# Patient Record
Sex: MALE | Race: WHITE | NOT HISPANIC OR LATINO | URBAN - METROPOLITAN AREA
[De-identification: names, ages, dates, MRNs, and addresses within clinical notes are randomized per-mention and may not be internally consistent; named-entity substitution may affect disease eponyms.]

---

## 2017-12-08 ENCOUNTER — EMERGENCY (EMERGENCY)
Facility: HOSPITAL | Age: 57
LOS: 1 days | Discharge: HOME | End: 2017-12-08
Attending: EMERGENCY MEDICINE
Payer: COMMERCIAL

## 2017-12-08 VITALS
SYSTOLIC BLOOD PRESSURE: 148 MMHG | DIASTOLIC BLOOD PRESSURE: 78 MMHG | WEIGHT: 184.97 LBS | HEART RATE: 100 BPM | RESPIRATION RATE: 18 BRPM | HEIGHT: 67 IN | OXYGEN SATURATION: 99 % | TEMPERATURE: 98 F

## 2017-12-08 LAB
ALBUMIN SERPL ELPH-MCNC: 4.6 G/DL — SIGNIFICANT CHANGE UP (ref 3.3–5.2)
ALP SERPL-CCNC: 82 U/L — SIGNIFICANT CHANGE UP (ref 40–120)
ALT FLD-CCNC: 45 U/L — HIGH
ANION GAP SERPL CALC-SCNC: 15 MMOL/L — SIGNIFICANT CHANGE UP (ref 5–17)
APTT BLD: 38.7 SEC — HIGH (ref 27.5–37.4)
AST SERPL-CCNC: 27 U/L — SIGNIFICANT CHANGE UP
BASOPHILS # BLD AUTO: 0 K/UL — SIGNIFICANT CHANGE UP (ref 0–0.2)
BASOPHILS NFR BLD AUTO: 0.2 % — SIGNIFICANT CHANGE UP (ref 0–2)
BILIRUB SERPL-MCNC: 0.5 MG/DL — SIGNIFICANT CHANGE UP (ref 0.4–2)
BUN SERPL-MCNC: 14 MG/DL — SIGNIFICANT CHANGE UP (ref 8–20)
CALCIUM SERPL-MCNC: 9.8 MG/DL — SIGNIFICANT CHANGE UP (ref 8.6–10.2)
CHLORIDE SERPL-SCNC: 102 MMOL/L — SIGNIFICANT CHANGE UP (ref 98–107)
CO2 SERPL-SCNC: 26 MMOL/L — SIGNIFICANT CHANGE UP (ref 22–29)
CREAT SERPL-MCNC: 0.81 MG/DL — SIGNIFICANT CHANGE UP (ref 0.5–1.3)
EOSINOPHIL # BLD AUTO: 0.1 K/UL — SIGNIFICANT CHANGE UP (ref 0–0.5)
EOSINOPHIL NFR BLD AUTO: 0.8 % — SIGNIFICANT CHANGE UP (ref 0–5)
GLUCOSE SERPL-MCNC: 116 MG/DL — HIGH (ref 70–115)
HCT VFR BLD CALC: 43.3 % — SIGNIFICANT CHANGE UP (ref 42–52)
HGB BLD-MCNC: 15.3 G/DL — SIGNIFICANT CHANGE UP (ref 14–18)
INR BLD: 1.04 RATIO — SIGNIFICANT CHANGE UP (ref 0.88–1.16)
LYMPHOCYTES # BLD AUTO: 2 K/UL — SIGNIFICANT CHANGE UP (ref 1–4.8)
LYMPHOCYTES # BLD AUTO: 21.2 % — SIGNIFICANT CHANGE UP (ref 20–55)
MCHC RBC-ENTMCNC: 28.3 PG — SIGNIFICANT CHANGE UP (ref 27–31)
MCHC RBC-ENTMCNC: 35.3 G/DL — SIGNIFICANT CHANGE UP (ref 32–36)
MCV RBC AUTO: 80 FL — SIGNIFICANT CHANGE UP (ref 80–94)
MONOCYTES # BLD AUTO: 0.7 K/UL — SIGNIFICANT CHANGE UP (ref 0–0.8)
MONOCYTES NFR BLD AUTO: 7.1 % — SIGNIFICANT CHANGE UP (ref 3–10)
NEUTROPHILS # BLD AUTO: 6.7 K/UL — SIGNIFICANT CHANGE UP (ref 1.8–8)
NEUTROPHILS NFR BLD AUTO: 70.5 % — SIGNIFICANT CHANGE UP (ref 37–73)
PLATELET # BLD AUTO: 159 K/UL — SIGNIFICANT CHANGE UP (ref 150–400)
POTASSIUM SERPL-MCNC: 4.1 MMOL/L — SIGNIFICANT CHANGE UP (ref 3.5–5.3)
POTASSIUM SERPL-SCNC: 4.1 MMOL/L — SIGNIFICANT CHANGE UP (ref 3.5–5.3)
PROT SERPL-MCNC: 7.6 G/DL — SIGNIFICANT CHANGE UP (ref 6.6–8.7)
PROTHROM AB SERPL-ACNC: 11.5 SEC — SIGNIFICANT CHANGE UP (ref 9.8–12.7)
RBC # BLD: 5.41 M/UL — SIGNIFICANT CHANGE UP (ref 4.6–6.2)
RBC # FLD: 13.3 % — SIGNIFICANT CHANGE UP (ref 11–15.6)
SODIUM SERPL-SCNC: 143 MMOL/L — SIGNIFICANT CHANGE UP (ref 135–145)
TROPONIN T SERPL-MCNC: <0.01 NG/ML — SIGNIFICANT CHANGE UP (ref 0–0.06)
WBC # BLD: 9.5 K/UL — SIGNIFICANT CHANGE UP (ref 4.8–10.8)
WBC # FLD AUTO: 9.5 K/UL — SIGNIFICANT CHANGE UP (ref 4.8–10.8)

## 2017-12-08 PROCEDURE — 93010 ELECTROCARDIOGRAM REPORT: CPT

## 2017-12-08 PROCEDURE — 99285 EMERGENCY DEPT VISIT HI MDM: CPT

## 2017-12-08 PROCEDURE — 71020: CPT | Mod: 26

## 2017-12-08 NOTE — ED ADULT NURSE NOTE - PERIPHERAL VASCULAR WDL
Pulses equal bilaterally, no edema present.
Alert and oriented, no focal deficits, no motor or sensory deficits.

## 2017-12-08 NOTE — ED ADULT NURSE NOTE - OBJECTIVE STATEMENT
Pt a&ox3 BIBA c.o generalized chest discomfort and epigastric pain s/p eating a meal, pt reports belching and then the pain started. Resting comfortably on stretcher with Charge RN Yared bedside, pt agitated with staff, charge rn bedside for service improvement. rr even and unlabored, maex4, in no apparent dsitress. #20g iv placed to left ac, bloodwork drawn and sent to lab, results pending. PO fluids provided, updated on POC, verbalized understanding for repeat bloodwork @ 0100, family @ bedside, will continue to monitor and reassess

## 2017-12-08 NOTE — ED PROVIDER NOTE - PROGRESS NOTE DETAILS
Pt is tolerating PO at this time. Pt is tolerating PO at this time. and feeling much better pt with heart score=3, pt will f/u cardio/gi and pt walked out w/o papers

## 2017-12-08 NOTE — ED PROVIDER NOTE - MEDICAL DECISION MAKING DETAILS
Will obtain blood work, labs, cardiac enzymes, medicate, cardio f/u outpatient for cardiac stress test, gi f/u outpatient and re-eval

## 2017-12-08 NOTE — ED ADULT TRIAGE NOTE - CHIEF COMPLAINT QUOTE
BIBA pt was having dinner and started experiencing some chest burning and started burping up. Pt states he feels like something is stuck in his esophagus. pt states it happened before.

## 2017-12-09 LAB — TROPONIN T SERPL-MCNC: <0.01 NG/ML — SIGNIFICANT CHANGE UP (ref 0–0.06)

## 2017-12-09 PROCEDURE — 85027 COMPLETE CBC AUTOMATED: CPT

## 2017-12-09 PROCEDURE — 93005 ELECTROCARDIOGRAM TRACING: CPT

## 2017-12-09 PROCEDURE — 85610 PROTHROMBIN TIME: CPT

## 2017-12-09 PROCEDURE — 36415 COLL VENOUS BLD VENIPUNCTURE: CPT

## 2017-12-09 PROCEDURE — 80053 COMPREHEN METABOLIC PANEL: CPT

## 2017-12-09 PROCEDURE — 71046 X-RAY EXAM CHEST 2 VIEWS: CPT

## 2017-12-09 PROCEDURE — 99283 EMERGENCY DEPT VISIT LOW MDM: CPT | Mod: 25

## 2017-12-09 PROCEDURE — 85730 THROMBOPLASTIN TIME PARTIAL: CPT

## 2017-12-09 PROCEDURE — 84484 ASSAY OF TROPONIN QUANT: CPT

## 2017-12-09 NOTE — ED ADULT NURSE REASSESSMENT NOTE - NS ED NURSE REASSESS COMMENT FT1
Pt baseline ms, in no apparent distress resting on stretcher with wife @ bedside, blood work drawn and sent to lab, results pending, po fluids offered updated on poc, pt verbalized understanding smiling and cooperative, will continue to monitor and reassess

## 2018-10-15 ENCOUNTER — RX RENEWAL (OUTPATIENT)
Age: 58
End: 2018-10-15

## 2019-01-03 ENCOUNTER — CHART COPY (OUTPATIENT)
Age: 59
End: 2019-01-03

## 2019-01-03 PROBLEM — K21.9 GASTRO-ESOPHAGEAL REFLUX DISEASE WITHOUT ESOPHAGITIS: Chronic | Status: ACTIVE | Noted: 2017-12-09

## 2019-01-03 PROBLEM — I10 ESSENTIAL (PRIMARY) HYPERTENSION: Chronic | Status: ACTIVE | Noted: 2017-12-09

## 2019-01-07 ENCOUNTER — APPOINTMENT (OUTPATIENT)
Dept: CARDIOLOGY | Facility: CLINIC | Age: 59
End: 2019-01-07
Payer: COMMERCIAL

## 2019-01-07 ENCOUNTER — RX RENEWAL (OUTPATIENT)
Age: 59
End: 2019-01-07

## 2019-01-07 VITALS
WEIGHT: 170 LBS | BODY MASS INDEX: 26.68 KG/M2 | HEART RATE: 69 BPM | SYSTOLIC BLOOD PRESSURE: 158 MMHG | DIASTOLIC BLOOD PRESSURE: 98 MMHG | RESPIRATION RATE: 14 BRPM | HEIGHT: 67 IN

## 2019-01-07 DIAGNOSIS — R07.2 PRECORDIAL PAIN: ICD-10-CM

## 2019-01-07 PROCEDURE — 99215 OFFICE O/P EST HI 40 MIN: CPT

## 2019-01-07 PROCEDURE — 93000 ELECTROCARDIOGRAM COMPLETE: CPT

## 2019-01-08 ENCOUNTER — APPOINTMENT (OUTPATIENT)
Dept: CARDIOLOGY | Facility: CLINIC | Age: 59
End: 2019-01-08
Payer: COMMERCIAL

## 2019-01-08 PROCEDURE — 93306 TTE W/DOPPLER COMPLETE: CPT

## 2019-01-08 NOTE — ASSESSMENT
[FreeTextEntry1] : 1.  EKG today reveals sinus rhythm at 69 bpm.  One PVC noted.  No acute ischemic changes.  LVH voltage. \par 2.  Hypertension:  Blood pressure is suboptimally controlled at this time.  I have advised patient to increase Ramipril to 10 mg b.i.d.  He will also continue Amlodipine at 5 mg b.i.d.  \par 3.  Chest pain with radiation to neck associated with flutters:  Patient to undergo 24-hour Holter monitor as well as an echocardiogram and a nuclear stress test.  I have advised him on low-dose aspirin.  He is to avoid any unnecessary exertion until further notice.   \par

## 2019-01-08 NOTE — PHYSICAL EXAM
[General Appearance - Well Developed] : well developed [General Appearance - Well Nourished] : well nourished [General Appearance - In No Acute Distress] : no acute distress [Normal Conjunctiva] : the conjunctiva exhibited no abnormalities [Normal Oral Mucosa] : normal oral mucosa [Auscultation Breath Sounds / Voice Sounds] : lungs were clear to auscultation bilaterally [Heart Rate And Rhythm] : heart rate and rhythm were normal [Heart Sounds] : normal S1 and S2 [Bowel Sounds] : normal bowel sounds [Abnormal Walk] : normal gait [Skin Color & Pigmentation] : normal skin color and pigmentation [Skin Turgor] : normal skin turgor [Oriented To Time, Place, And Person] : oriented to person, place, and time [Affect] : the affect was normal [Mood] : the mood was normal [FreeTextEntry1] : No edema

## 2019-01-08 NOTE — REASON FOR VISIT
[FreeTextEntry1] : Mr. Hurd presents today for the evaluation and management of hypertension and hypercholesterolemia. \par   \par

## 2019-01-08 NOTE — HISTORY OF PRESENT ILLNESS
[FreeTextEntry1] : Mr. Hurd states that he recently developed retrosternal chest discomfort that he described as a pressure-like sensation.  Chest pain radiated to his neck where he felt a “choking-like” sensation.  He admits to associated shortness of breath.  He has also been experiencing “flutter-like” sensations which appear to be intermittent.  There has been no lightheadedness or syncope.

## 2019-01-23 ENCOUNTER — APPOINTMENT (OUTPATIENT)
Dept: CARDIOLOGY | Facility: CLINIC | Age: 59
End: 2019-01-23
Payer: COMMERCIAL

## 2019-01-23 PROCEDURE — 93015 CV STRESS TEST SUPVJ I&R: CPT

## 2019-01-23 PROCEDURE — A9500: CPT

## 2019-01-23 PROCEDURE — 78452 HT MUSCLE IMAGE SPECT MULT: CPT

## 2019-01-24 ENCOUNTER — RX RENEWAL (OUTPATIENT)
Age: 59
End: 2019-01-24

## 2019-01-29 RX ORDER — KIT FOR THE PREPARATION OF TECHNETIUM TC99M SESTAMIBI 1 MG/5ML
INJECTION, POWDER, LYOPHILIZED, FOR SOLUTION PARENTERAL
Refills: 0 | Status: COMPLETED | OUTPATIENT
Start: 2019-01-29

## 2019-01-29 RX ADMIN — KIT FOR THE PREPARATION OF TECHNETIUM TC99M SESTAMIBI 0: 1 INJECTION, POWDER, LYOPHILIZED, FOR SOLUTION PARENTERAL at 00:00

## 2019-03-05 ENCOUNTER — APPOINTMENT (OUTPATIENT)
Dept: CARDIOLOGY | Facility: CLINIC | Age: 59
End: 2019-03-05
Payer: COMMERCIAL

## 2019-03-05 VITALS
HEIGHT: 67 IN | RESPIRATION RATE: 14 BRPM | DIASTOLIC BLOOD PRESSURE: 86 MMHG | WEIGHT: 167 LBS | BODY MASS INDEX: 26.21 KG/M2 | HEART RATE: 71 BPM | SYSTOLIC BLOOD PRESSURE: 132 MMHG

## 2019-03-05 PROCEDURE — 93000 ELECTROCARDIOGRAM COMPLETE: CPT

## 2019-03-05 PROCEDURE — 99213 OFFICE O/P EST LOW 20 MIN: CPT

## 2019-08-27 ENCOUNTER — APPOINTMENT (OUTPATIENT)
Dept: CARDIOLOGY | Facility: CLINIC | Age: 59
End: 2019-08-27
Payer: COMMERCIAL

## 2019-08-27 ENCOUNTER — NON-APPOINTMENT (OUTPATIENT)
Age: 59
End: 2019-08-27

## 2019-08-27 VITALS
HEIGHT: 67 IN | BODY MASS INDEX: 26.21 KG/M2 | HEART RATE: 75 BPM | SYSTOLIC BLOOD PRESSURE: 123 MMHG | WEIGHT: 167 LBS | RESPIRATION RATE: 14 BRPM | DIASTOLIC BLOOD PRESSURE: 78 MMHG

## 2019-08-27 PROCEDURE — 99213 OFFICE O/P EST LOW 20 MIN: CPT

## 2019-08-27 PROCEDURE — 93000 ELECTROCARDIOGRAM COMPLETE: CPT

## 2019-08-27 NOTE — DISCUSSION/SUMMARY
[FreeTextEntry1] : 1).  Patient is tolerating cardiac medications without negative side effects, continue with current cardiac medication regimen (refer above).\par \par 2).  Follow up with PCP (Dr. Ignacio) regarding routine checkups and blood work, have copy faxed to our office. \par \par 3).  Diet and lifestyle modification discussed including low fat and low carbohydrate weight reducing diet, implement aerobic exercise 4 to 5 days per week. \par \par 4).  No additional cardiac testing indicated at this time. \par \par 5).  Follow up with our office in 6 months or PRN.

## 2019-08-27 NOTE — REASON FOR VISIT
[FreeTextEntry1] : Mr. Hurd presents today for the evaluation and management of hypertension and hypercholesterolemia.  The patient reports feeling overall well and without cardiac complaints.  The patient denies CP, SOB, GONSALEZ, PND, orthopnea, palpitations, presyncope, syncope.\par .

## 2019-08-27 NOTE — PHYSICAL EXAM
[General Appearance - Well Developed] : well developed [Normal Appearance] : normal appearance [General Appearance - Well Nourished] : well nourished [General Appearance - In No Acute Distress] : no acute distress [Normal Conjunctiva] : the conjunctiva exhibited no abnormalities [Normal Oral Mucosa] : normal oral mucosa [Normal Jugular Venous V Waves Present] : normal jugular venous V waves present [Normal Jugular Venous A Waves Present] : normal jugular venous A waves present [Respiration, Rhythm And Depth] : normal respiratory rhythm and effort [No Jugular Venous Martínez A Waves] : no jugular venous martínez A waves [] : no respiratory distress [Auscultation Breath Sounds / Voice Sounds] : lungs were clear to auscultation bilaterally [Heart Sounds] : normal S1 and S2 [Heart Rate And Rhythm] : heart rate and rhythm were normal [Edema] : no peripheral edema present [Murmurs] : no murmurs present [Abnormal Walk] : normal gait [Bowel Sounds] : normal bowel sounds [Cyanosis, Localized] : no localized cyanosis [Nail Clubbing] : no clubbing of the fingernails [Skin Color & Pigmentation] : normal skin color and pigmentation [Skin Turgor] : normal skin turgor [Oriented To Time, Place, And Person] : oriented to person, place, and time [Affect] : the affect was normal [Impaired Insight] : insight and judgment were intact

## 2019-08-27 NOTE — ASSESSMENT
[FreeTextEntry1] : EKG 8/27/2019:  The EKG illustrates normal sinus rhythm, rate of 75, no significant ST-T wave changes.   Essentially unchanged.\par \par

## 2020-02-07 ENCOUNTER — APPOINTMENT (OUTPATIENT)
Dept: CARDIOLOGY | Facility: CLINIC | Age: 60
End: 2020-02-07
Payer: COMMERCIAL

## 2020-02-07 VITALS
WEIGHT: 166 LBS | DIASTOLIC BLOOD PRESSURE: 80 MMHG | BODY MASS INDEX: 26.06 KG/M2 | SYSTOLIC BLOOD PRESSURE: 116 MMHG | RESPIRATION RATE: 14 BRPM | HEIGHT: 67 IN | HEART RATE: 66 BPM

## 2020-02-07 DIAGNOSIS — R94.31 ABNORMAL ELECTROCARDIOGRAM [ECG] [EKG]: ICD-10-CM

## 2020-02-07 DIAGNOSIS — R00.2 PALPITATIONS: ICD-10-CM

## 2020-02-07 PROCEDURE — 99214 OFFICE O/P EST MOD 30 MIN: CPT

## 2020-02-07 PROCEDURE — 93000 ELECTROCARDIOGRAM COMPLETE: CPT

## 2020-02-10 NOTE — PHYSICAL EXAM
[General Appearance - Well Developed] : well developed [General Appearance - Well Nourished] : well nourished [General Appearance - In No Acute Distress] : no acute distress [Normal Conjunctiva] : the conjunctiva exhibited no abnormalities [Normal Oral Mucosa] : normal oral mucosa [Auscultation Breath Sounds / Voice Sounds] : lungs were clear to auscultation bilaterally [Heart Rate And Rhythm] : heart rate and rhythm were normal [Heart Sounds] : normal S1 and S2 [Abnormal Walk] : normal gait [Bowel Sounds] : normal bowel sounds [Skin Color & Pigmentation] : normal skin color and pigmentation [Skin Turgor] : normal skin turgor [Oriented To Time, Place, And Person] : oriented to person, place, and time [Affect] : the affect was normal [Mood] : the mood was normal [FreeTextEntry1] : No edema

## 2020-02-10 NOTE — ASSESSMENT
[FreeTextEntry1] : 1.  EKG today reveals sinus rhythm at 66 bpm.  Normal intervals.  No evidence of ischemia.  2.  Hyperlipidemia:  Recent lipid profile reveals a total cholesterol of 170, HDL 41, , triglycerides 122, TC/HDL ratio 4.1.  Patient advised on a low-fat / low-cholesterol diet as well as continuation of current medications. 3.  Hypertension:   Blood pressure well controlled at this time on current medications.   4.  Elevated PSA:  Recent PSA of 8.6.   Patient has a urologist and has undergone biopsies and MRIs.  There is no evidence of malignancy at this point.  Being followed closely by urology.   5.  From a cardiac standpoint, patient advised on a low-fat / low-cholesterol diet and regular aerobic exercise.  If clinically stable, office visit six months.

## 2020-02-10 NOTE — REASON FOR VISIT
[FreeTextEntry1] : Mr. Hurd is a pleasant 59-year-old white male with a past medical history significant for hypertension as well as hypercholesterolemia, peripheral edema who presents for cardiac evaluation.   \par \par

## 2020-02-10 NOTE — HISTORY OF PRESENT ILLNESS
[FreeTextEntry1] :  From a cardiac standpoint, Mr. Hurd denies exertional chest pain, shortness of breath, palpitations, lightheadedness, or syncope.  He states that he is reasonably stable but with occasional bouts of elevated blood pressure.  Some of this occurs when emotionally upset and at other times with left shoulder pain.

## 2020-08-07 ENCOUNTER — APPOINTMENT (OUTPATIENT)
Dept: CARDIOLOGY | Facility: CLINIC | Age: 60
End: 2020-08-07
Payer: COMMERCIAL

## 2020-08-07 VITALS
HEIGHT: 67 IN | BODY MASS INDEX: 25.9 KG/M2 | WEIGHT: 165 LBS | SYSTOLIC BLOOD PRESSURE: 118 MMHG | RESPIRATION RATE: 14 BRPM | TEMPERATURE: 97.9 F | HEART RATE: 70 BPM | DIASTOLIC BLOOD PRESSURE: 82 MMHG

## 2020-08-07 PROCEDURE — 99213 OFFICE O/P EST LOW 20 MIN: CPT

## 2020-08-07 PROCEDURE — 93000 ELECTROCARDIOGRAM COMPLETE: CPT

## 2020-08-11 NOTE — HISTORY OF PRESENT ILLNESS
[FreeTextEntry1] : From a cardiac standpoint, Mr. Hurd denies exertional chest pain, shortness of breath, or other cardiac symptoms.  His only comment at this time is that he will undergo complete replacement of his left shoulder at [facility] with Dr. Serafin Minor on Tuesday, August 11.

## 2020-08-11 NOTE — REASON FOR VISIT
[FreeTextEntry1] : Mr. Hurd is a pleasant 60-year-old white male with a past medical history significant for hypertension as well as hypercholesterolemia and intermittent peripheral edema, who presents for evaluation.   \par \par \par

## 2020-08-11 NOTE — PHYSICAL EXAM
[General Appearance - Well Developed] : well developed [General Appearance - Well Nourished] : well nourished [General Appearance - In No Acute Distress] : no acute distress [Normal Conjunctiva] : the conjunctiva exhibited no abnormalities [Normal Oral Mucosa] : normal oral mucosa [Auscultation Breath Sounds / Voice Sounds] : lungs were clear to auscultation bilaterally [Heart Sounds] : normal S1 and S2 [Bowel Sounds] : normal bowel sounds [Heart Rate And Rhythm] : heart rate and rhythm were normal [Abnormal Walk] : normal gait [Skin Color & Pigmentation] : normal skin color and pigmentation [Oriented To Time, Place, And Person] : oriented to person, place, and time [Skin Turgor] : normal skin turgor [Affect] : the affect was normal [Mood] : the mood was normal [FreeTextEntry1] : No edema

## 2021-03-04 ENCOUNTER — APPOINTMENT (OUTPATIENT)
Dept: CARDIOLOGY | Facility: CLINIC | Age: 61
End: 2021-03-04
Payer: COMMERCIAL

## 2021-03-04 ENCOUNTER — NON-APPOINTMENT (OUTPATIENT)
Age: 61
End: 2021-03-04

## 2021-03-04 VITALS
BODY MASS INDEX: 26.53 KG/M2 | RESPIRATION RATE: 14 BRPM | HEART RATE: 73 BPM | DIASTOLIC BLOOD PRESSURE: 70 MMHG | HEIGHT: 67 IN | SYSTOLIC BLOOD PRESSURE: 128 MMHG | TEMPERATURE: 97.5 F | WEIGHT: 169 LBS

## 2021-03-04 DIAGNOSIS — Z01.818 ENCOUNTER FOR OTHER PREPROCEDURAL EXAMINATION: ICD-10-CM

## 2021-03-04 PROCEDURE — 99213 OFFICE O/P EST LOW 20 MIN: CPT

## 2021-03-04 PROCEDURE — 93000 ELECTROCARDIOGRAM COMPLETE: CPT

## 2021-03-04 PROCEDURE — 99072 ADDL SUPL MATRL&STAF TM PHE: CPT

## 2021-03-05 NOTE — HISTORY OF PRESENT ILLNESS
[FreeTextEntry1] : Mr. Hurd presents today for cardiac clearance for an upcoming cystoscopy, urethroscopy, laser lithotripsy and stent with Dr. Starks on 3/9/2021.  Presently the patient is feeling well.  Denies complaints of chest pain, shortness of breath, palpitations, lightheadedness or syncope.

## 2021-03-05 NOTE — REASON FOR VISIT
[FreeTextEntry1] : The patient is a pleasant 61-year-old white male with a past medical history significant for hypertension as well as hypertension and intermittent peripheral edema, who presents for follow up evaluation.

## 2021-03-05 NOTE — DISCUSSION/SUMMARY
[FreeTextEntry1] : 1 - Hypertension:  blood pressure well controlled on current medications.  Advised to follow low sodium diet.\par \par 2 - Hypercholesterolemia: : labs from PCP 2/16/2021 - cholesterol 143, HDL 33, LDL  77, triglycerides 167, TC/HDL 4.3.  Continue with atorvastatin 20mg daily.   Follow low fat, low cholesterol diet.  Will have follow up blood work through PCP office and will have results forwarded to our office.\par \par 3 - Cystoscopy, urethroscopy, laser lithotripsy and stent with Dr. Starks on 3/9/2021: at this time there are no absolute cardiac contraindications for Mr. Hurd to proceed with his need procedure without cardiac precautions.\par \par 4 - Follow up with Dr. Bermudez in one year.

## 2021-12-27 ENCOUNTER — OUTPATIENT (OUTPATIENT)
Dept: OUTPATIENT SERVICES | Facility: HOSPITAL | Age: 61
LOS: 1 days | End: 2021-12-27
Payer: COMMERCIAL

## 2021-12-27 ENCOUNTER — TRANSCRIPTION ENCOUNTER (OUTPATIENT)
Age: 61
End: 2021-12-27

## 2021-12-27 VITALS
WEIGHT: 169.98 LBS | OXYGEN SATURATION: 99 % | SYSTOLIC BLOOD PRESSURE: 137 MMHG | RESPIRATION RATE: 14 BRPM | HEIGHT: 66 IN | TEMPERATURE: 97 F | DIASTOLIC BLOOD PRESSURE: 70 MMHG | HEART RATE: 78 BPM

## 2021-12-27 DIAGNOSIS — Z96.612 PRESENCE OF LEFT ARTIFICIAL SHOULDER JOINT: Chronic | ICD-10-CM

## 2021-12-27 DIAGNOSIS — G56.01 CARPAL TUNNEL SYNDROME, RIGHT UPPER LIMB: ICD-10-CM

## 2021-12-27 DIAGNOSIS — Z98.890 OTHER SPECIFIED POSTPROCEDURAL STATES: Chronic | ICD-10-CM

## 2021-12-27 DIAGNOSIS — Z90.49 ACQUIRED ABSENCE OF OTHER SPECIFIED PARTS OF DIGESTIVE TRACT: Chronic | ICD-10-CM

## 2021-12-27 DIAGNOSIS — Z01.818 ENCOUNTER FOR OTHER PREPROCEDURAL EXAMINATION: ICD-10-CM

## 2021-12-27 DIAGNOSIS — Z96.0 PRESENCE OF UROGENITAL IMPLANTS: Chronic | ICD-10-CM

## 2021-12-27 LAB
ANION GAP SERPL CALC-SCNC: 9 MMOL/L — SIGNIFICANT CHANGE UP (ref 5–17)
BUN SERPL-MCNC: 19 MG/DL — SIGNIFICANT CHANGE UP (ref 7–23)
CALCIUM SERPL-MCNC: 9.2 MG/DL — SIGNIFICANT CHANGE UP (ref 8.4–10.5)
CHLORIDE SERPL-SCNC: 103 MMOL/L — SIGNIFICANT CHANGE UP (ref 96–108)
CO2 SERPL-SCNC: 26 MMOL/L — SIGNIFICANT CHANGE UP (ref 22–31)
CREAT SERPL-MCNC: 0.84 MG/DL — SIGNIFICANT CHANGE UP (ref 0.5–1.3)
GLUCOSE SERPL-MCNC: 133 MG/DL — HIGH (ref 70–99)
HCT VFR BLD CALC: 47.9 % — SIGNIFICANT CHANGE UP (ref 39–50)
HGB BLD-MCNC: 16.1 G/DL — SIGNIFICANT CHANGE UP (ref 13–17)
MCHC RBC-ENTMCNC: 27.5 PG — SIGNIFICANT CHANGE UP (ref 27–34)
MCHC RBC-ENTMCNC: 33.6 GM/DL — SIGNIFICANT CHANGE UP (ref 32–36)
MCV RBC AUTO: 81.7 FL — SIGNIFICANT CHANGE UP (ref 80–100)
NRBC # BLD: 0 /100 WBCS — SIGNIFICANT CHANGE UP (ref 0–0)
PLATELET # BLD AUTO: 194 K/UL — SIGNIFICANT CHANGE UP (ref 150–400)
POTASSIUM SERPL-MCNC: 4 MMOL/L — SIGNIFICANT CHANGE UP (ref 3.5–5.3)
POTASSIUM SERPL-SCNC: 4 MMOL/L — SIGNIFICANT CHANGE UP (ref 3.5–5.3)
RBC # BLD: 5.86 M/UL — HIGH (ref 4.2–5.8)
RBC # FLD: 13.2 % — SIGNIFICANT CHANGE UP (ref 10.3–14.5)
SODIUM SERPL-SCNC: 138 MMOL/L — SIGNIFICANT CHANGE UP (ref 135–145)
WBC # BLD: 5.71 K/UL — SIGNIFICANT CHANGE UP (ref 3.8–10.5)
WBC # FLD AUTO: 5.71 K/UL — SIGNIFICANT CHANGE UP (ref 3.8–10.5)

## 2021-12-27 PROCEDURE — G0463: CPT

## 2021-12-27 PROCEDURE — 93005 ELECTROCARDIOGRAM TRACING: CPT

## 2021-12-27 PROCEDURE — 36415 COLL VENOUS BLD VENIPUNCTURE: CPT

## 2021-12-27 PROCEDURE — 93010 ELECTROCARDIOGRAM REPORT: CPT

## 2021-12-27 PROCEDURE — 80048 BASIC METABOLIC PNL TOTAL CA: CPT

## 2021-12-27 PROCEDURE — 85027 COMPLETE CBC AUTOMATED: CPT

## 2021-12-27 RX ORDER — RAMIPRIL 5 MG
0 CAPSULE ORAL
Qty: 0 | Refills: 0 | DISCHARGE

## 2021-12-27 RX ORDER — OMEPRAZOLE 10 MG/1
0 CAPSULE, DELAYED RELEASE ORAL
Qty: 0 | Refills: 0 | DISCHARGE

## 2021-12-27 RX ORDER — ATORVASTATIN CALCIUM 80 MG/1
0 TABLET, FILM COATED ORAL
Qty: 0 | Refills: 0 | DISCHARGE

## 2021-12-27 RX ORDER — AMLODIPINE BESYLATE 2.5 MG/1
0 TABLET ORAL
Qty: 0 | Refills: 0 | DISCHARGE

## 2021-12-27 RX ORDER — TAMSULOSIN HYDROCHLORIDE 0.4 MG/1
0 CAPSULE ORAL
Qty: 0 | Refills: 0 | DISCHARGE

## 2021-12-27 NOTE — H&P PST ADULT - NSICDXPASTMEDICALHX_GEN_ALL_CORE_FT
PAST MEDICAL HISTORY:  BPH (benign prostatic hyperplasia)     GERD (gastroesophageal reflux disease)     HLD (hyperlipidemia)     HTN (hypertension)     Nephrolithiasis      PAST MEDICAL HISTORY:  BPH (benign prostatic hyperplasia)     GERD (gastroesophageal reflux disease)     HLD (hyperlipidemia)     [place] (hard of hearing) uses hearing aid    HTN (hypertension)     Nephrolithiasis      PAST MEDICAL HISTORY:  BPH (benign prostatic hyperplasia)     Carpal tunnel syndrome, right     GERD (gastroesophageal reflux disease)     HLD (hyperlipidemia)     [place] (hard of hearing) uses hearing aid    HTN (hypertension)     Nephrolithiasis

## 2021-12-27 NOTE — H&P PST ADULT - PROBLEM SELECTOR PLAN 1
scheduled for right carpal tunnel release on 1/14/22  will obtain medical clearance   Pre op instructions on wash , medications  COVID test on 1/12/2/at 9 am

## 2021-12-27 NOTE — H&P PST ADULT - NSICDXFAMILYHX_GEN_ALL_CORE_FT
FAMILY HISTORY:  Father  Still living? No  Family history of colon cancer in father, Age at diagnosis: Age Unknown  Family history of prostate cancer in father, Age at diagnosis: Age Unknown  FHx: lymphoma, Age at diagnosis: Age Unknown    Mother  Still living? Yes, Estimated age: Age Unknown  FHx: type 2 diabetes mellitus, Age at diagnosis: Age Unknown    Sibling  Still living? Yes, Estimated age: Age Unknown  FH: CAD (coronary artery disease), Age at diagnosis: Age Unknown

## 2021-12-27 NOTE — H&P PST ADULT - HISTORY OF PRESENT ILLNESS
This is a 60 y/o male who presents with numbness and tingling in right middle and ringer finger tips for the past one yr. Treated with cortisone injections with temporary relief .  . EMG studies which showed carpal tunnel . scheduled for right carpal tunnel release on 1/14/22 This is a 62 y/o male who presents with numbness and tingling in right middle and ring finger tips for the past one year Treated with cortisone injections with temporary relief . uses brace  . EMG studies which showed carpal tunnel . scheduled for right carpal tunnel release on 1/14/22

## 2021-12-27 NOTE — H&P PST ADULT - NSICDXPASTSURGICALHX_GEN_ALL_CORE_FT
PAST SURGICAL HISTORY:  H/O prostate biopsy 2018.2019    History of lithotripsy ESWL procedure 2019    S/P appendectomy 1969    S/P colon resection 2013 sec to multiple diverticulitis attack    S/P cystoscopy with ureteral stent placement laser lithotripsy 3/2021    S/P shoulder replacement, left 2020    S/P shoulder surgery left 1991     PAST SURGICAL HISTORY:  H/O prostate biopsy 2018.2019    History of lithotripsy ESWL procedure 2019    S/P appendectomy 1969    S/P colon resection 2013 sec to multiple diverticulitis attack    S/P cystoscopy with ureteral stent placement laser lithotripsy 3/2021    S/P shoulder replacement, left 2020    S/P shoulder surgery left 1991    Status post dilation of esophageal narrowing h/o esophageal stricture sec to vitamin C tablets get stuck in esophagus yrs ago

## 2022-06-10 PROBLEM — G56.01 CARPAL TUNNEL SYNDROME, RIGHT UPPER LIMB: Chronic | Status: ACTIVE | Noted: 2021-12-27

## 2022-06-10 PROBLEM — E78.5 HYPERLIPIDEMIA, UNSPECIFIED: Chronic | Status: ACTIVE | Noted: 2021-12-27

## 2022-06-10 PROBLEM — N40.0 BENIGN PROSTATIC HYPERPLASIA WITHOUT LOWER URINARY TRACT SYMPTOMS: Chronic | Status: ACTIVE | Noted: 2021-12-27

## 2022-06-10 PROBLEM — N20.0 CALCULUS OF KIDNEY: Chronic | Status: ACTIVE | Noted: 2021-12-27

## 2022-06-10 PROBLEM — H91.90 UNSPECIFIED HEARING LOSS, UNSPECIFIED EAR: Chronic | Status: ACTIVE | Noted: 2021-12-27

## 2022-07-13 ENCOUNTER — APPOINTMENT (OUTPATIENT)
Dept: CARDIOLOGY | Facility: CLINIC | Age: 62
End: 2022-07-13

## 2022-07-13 ENCOUNTER — NON-APPOINTMENT (OUTPATIENT)
Age: 62
End: 2022-07-13

## 2022-07-13 VITALS
DIASTOLIC BLOOD PRESSURE: 80 MMHG | BODY MASS INDEX: 26.84 KG/M2 | HEIGHT: 66 IN | WEIGHT: 167 LBS | RESPIRATION RATE: 14 BRPM | OXYGEN SATURATION: 97 % | HEART RATE: 68 BPM | SYSTOLIC BLOOD PRESSURE: 130 MMHG

## 2022-07-13 DIAGNOSIS — Z86.16 PERSONAL HISTORY OF COVID-19: ICD-10-CM

## 2022-07-13 PROCEDURE — 93000 ELECTROCARDIOGRAM COMPLETE: CPT

## 2022-07-13 PROCEDURE — 99214 OFFICE O/P EST MOD 30 MIN: CPT | Mod: 25

## 2022-07-13 RX ORDER — METRONIDAZOLE 500 MG/1
500 TABLET ORAL
Qty: 14 | Refills: 0 | Status: DISCONTINUED | COMMUNITY
Start: 2022-06-27

## 2022-07-13 RX ORDER — CIPROFLOXACIN HYDROCHLORIDE 500 MG/1
500 TABLET, FILM COATED ORAL
Qty: 14 | Refills: 0 | Status: DISCONTINUED | COMMUNITY
Start: 2022-06-27

## 2022-07-13 NOTE — HISTORY OF PRESENT ILLNESS
[FreeTextEntry1] : Mr. Hurd presents today feeling well.  Denies complaints of exertional chest pain, shortness of breath, palpitations, lightheadedness or syncope.  He is status-post COVID-19 infection in March of this year. States he had mild symptoms.  His only concern is that he occasionally checks his pulse oximetry and it runs 94-97%.  He is retired and tries to keep himself as active as possible.

## 2022-07-13 NOTE — DISCUSSION/SUMMARY
[FreeTextEntry1] : 1 - Hypertension:  blood pressure well controlled on current medications.  Advised to follow low sodium diet.\par \par 2 - Hyperlipidemia:  no recent lipid panel.  Continue Atorvastatin 20mg daily.  Follow low fat, low cholesterol diet.  Fasting blood work prior to follow up visit.\par \par 3 - Status-post COVID-19 infection in March of this year:  States he had mild symptoms.  His only concern is that he occasionally checks his pulse oximetry and it runs 94-97%.  Today's pulse oximetry on room air is 97%.\par \par 4 - Valvular heart disease:  patient with known mild aortic regurgitation.  trace mitral and pulmonic valve regurgitation.  Will schedule Mr. Hurd for a follow up echocardiogram.\par \par 5 - Recent labs from PCP (6/27/2022):  glucose 95, BUN 20, creatinine 0.67, potassium  4.4, H/H 15.5/46.5, platelets 213.\par \par 6 - Follow up with Dr. Bermudez in 3 months.

## 2022-07-13 NOTE — REASON FOR VISIT
[FreeTextEntry1] : Mr. Hurd is a pleasant 62-year-old white male with a past medical history significant for hypertension as well as hypertension and intermittent peripheral edema, who presents for follow up evaluation.

## 2022-09-29 ENCOUNTER — APPOINTMENT (OUTPATIENT)
Dept: CARDIOLOGY | Facility: CLINIC | Age: 62
End: 2022-09-29

## 2022-09-29 PROCEDURE — 93306 TTE W/DOPPLER COMPLETE: CPT

## 2022-11-01 ENCOUNTER — APPOINTMENT (OUTPATIENT)
Dept: CARDIOLOGY | Facility: CLINIC | Age: 62
End: 2022-11-01

## 2022-11-01 VITALS
RESPIRATION RATE: 16 BRPM | BODY MASS INDEX: 26.84 KG/M2 | HEIGHT: 66 IN | WEIGHT: 167 LBS | DIASTOLIC BLOOD PRESSURE: 68 MMHG | SYSTOLIC BLOOD PRESSURE: 118 MMHG | HEART RATE: 68 BPM

## 2022-11-01 PROCEDURE — 99214 OFFICE O/P EST MOD 30 MIN: CPT | Mod: 25

## 2022-11-01 PROCEDURE — 93000 ELECTROCARDIOGRAM COMPLETE: CPT

## 2022-11-01 RX ORDER — SULFAMETHOXAZOLE AND TRIMETHOPRIM 800; 160 MG/1; MG/1
800-160 TABLET ORAL
Qty: 28 | Refills: 0 | Status: DISCONTINUED | COMMUNITY
Start: 2022-07-08 | End: 2022-11-01

## 2022-11-03 NOTE — ASSESSMENT
[FreeTextEntry1] : 1.  EKG today reveals normal sinus rhythm at 68 bpm.  Normal intervals.  No evidence of ischemia. \par \par 2.  Hypertension:  Blood pressure appears well controlled at this time on current medications.  A low-salt diet is advised. \par \par 3.  Hyperlipidemia:  Review of recent bloodwork demonstrates a total cholesterol of 167, HDL 38, , TC/HDL ratio 4.4, triglycerides 145.  Patient is advised to continue with Atorvastatin 20 mg q.h.s. and follow a stricter low-fat / low-cholesterol diet.  Will undergo repeat bloodwork prior to his next office visit.  \par \par 4.  Hemoglobin A1C of 5.8:  Patient advised on a stricter low-carbohydrate diet and follow up through his PCP’s office. \par \par 5.  Total PSA 9.7:  Patient states that this is “better than previous values.”  Currently under the care of urology.  \par \par 6.  Valvular heart disease:  Patient recently underwent echocardiography which revealed normal left ventricular chamber dimensions and wall motion with preserved ejection fraction estimated between 65 and 70%.  Mild tricuspid regurgitation.  Mild aortic insufficiency and trace mitral regurgitation are noted.  No other significant findings.  \par \par 7.  Given the above, the patient is advised on a low-fat / low-cholesterol and low-carbohydrate diet as well as regular aerobic exercise.  If clinically stable, fasting bloodwork and an office visit in six months.   \par

## 2022-11-03 NOTE — REASON FOR VISIT
[FreeTextEntry1] : Mr. Hurd is a pleasant 62-year-old white male with a past medical history significant for hypertension as well as hyperlipidemia, and valvular heart disease who presents for evaluation.   \par \par \par

## 2022-11-03 NOTE — HISTORY OF PRESENT ILLNESS
[FreeTextEntry1] : Mr. Hurd denies exertional chest pain, shortness of breath, or other cardiac symptoms.  He remains physically active.  \par

## 2023-04-20 ENCOUNTER — NON-APPOINTMENT (OUTPATIENT)
Age: 63
End: 2023-04-20

## 2023-04-26 ENCOUNTER — APPOINTMENT (OUTPATIENT)
Dept: CARDIOLOGY | Facility: CLINIC | Age: 63
End: 2023-04-26
Payer: COMMERCIAL

## 2023-04-26 VITALS
BODY MASS INDEX: 26.68 KG/M2 | RESPIRATION RATE: 16 BRPM | WEIGHT: 170 LBS | HEIGHT: 67 IN | SYSTOLIC BLOOD PRESSURE: 140 MMHG | DIASTOLIC BLOOD PRESSURE: 84 MMHG | HEART RATE: 72 BPM

## 2023-04-26 PROCEDURE — 99214 OFFICE O/P EST MOD 30 MIN: CPT | Mod: 25

## 2023-04-26 PROCEDURE — 93000 ELECTROCARDIOGRAM COMPLETE: CPT

## 2023-04-27 NOTE — HISTORY OF PRESENT ILLNESS
[FreeTextEntry1] : From a cardiac standpoint, Mr. Hurd denies exertional chest pain, shortness of breath, or other cardiac symptoms.  He does admit to being less than ideally compliant with a strict low-fat / low-cholesterol diet following the discontinuation of Atorvastatin.  \par

## 2023-04-27 NOTE — REASON FOR VISIT
[FreeTextEntry1] : Mr. Hurd is a pleasant 63-year-old white male with a past medical history significant for hypertension, hyperlipidemia, and GERD, whom recently developed toe fungus requiring the use of Terbinafine, along with the discontinuation of his statin. \par \par \par

## 2023-04-27 NOTE — ASSESSMENT
[FreeTextEntry1] : \par 1. EKG today reveals normal sinus rhythm at 72 bpm.  Normal intervals.  No evidence of ischemia. \par \par 2. Hypertension: Blood pressure borderline controlled at this time on current medications.   A strict low-salt diet and weight loss discussed. \par \par 3. Hyperlipidemia:  Recent lipid profile obtained after the discontinuation of Atorvastatin reveals a total cholesterol of 244, HDL 32, LDL incalculable due a triglyceride level of 698.  TC/HDL ratio 7.6.  \par \par 4. Patient advised to continue withholding Atorvastatin therapy while completing his toe fungal therapy.  Will begin Vascepa 0.5 mg capsules four in a.m. and four in p.m.  Will undergo follow up bloodwork in approximately three months. \par \par 5. Chest discomfort:  Patient developed chest discomfort while walking briskly the other day.  He stopped and it subsided.  Will schedule nuclear stress test for further evaluation.   \par

## 2023-06-06 ENCOUNTER — APPOINTMENT (OUTPATIENT)
Dept: CARDIOLOGY | Facility: CLINIC | Age: 63
End: 2023-06-06
Payer: COMMERCIAL

## 2023-06-06 PROCEDURE — A9500: CPT

## 2023-06-06 PROCEDURE — 78452 HT MUSCLE IMAGE SPECT MULT: CPT

## 2023-06-06 PROCEDURE — 93015 CV STRESS TEST SUPVJ I&R: CPT

## 2023-08-01 ENCOUNTER — APPOINTMENT (OUTPATIENT)
Dept: CARDIOLOGY | Facility: CLINIC | Age: 63
End: 2023-08-01
Payer: COMMERCIAL

## 2023-08-01 VITALS
SYSTOLIC BLOOD PRESSURE: 134 MMHG | BODY MASS INDEX: 25.11 KG/M2 | OXYGEN SATURATION: 97 % | HEIGHT: 67 IN | DIASTOLIC BLOOD PRESSURE: 70 MMHG | HEART RATE: 61 BPM | WEIGHT: 160 LBS

## 2023-08-01 DIAGNOSIS — R07.9 CHEST PAIN, UNSPECIFIED: ICD-10-CM

## 2023-08-01 PROCEDURE — 99214 OFFICE O/P EST MOD 30 MIN: CPT | Mod: 25

## 2023-08-01 PROCEDURE — 93000 ELECTROCARDIOGRAM COMPLETE: CPT

## 2023-08-01 RX ORDER — ATORVASTATIN CALCIUM 20 MG/1
20 TABLET, FILM COATED ORAL DAILY
Refills: 0 | Status: DISCONTINUED | COMMUNITY
End: 2023-08-01

## 2023-08-02 NOTE — HISTORY OF PRESENT ILLNESS
[FreeTextEntry1] : From a cardiac standpoint, Mr. Hurd denies exertional chest pain, shortness of breath, or other cardiac symptoms. He has been dieting and has managed to lose 10 pounds.

## 2023-08-02 NOTE — REASON FOR VISIT
[FreeTextEntry1] : Mr. Hurd is a pleasant 63-year-old white male with a past medical history significant for hypertension, hyperlipidemia, and valvular heart disease, who presents for follow up.

## 2023-08-02 NOTE — ASSESSMENT
[FreeTextEntry1] : 1. EKG today reveals normal sinus rhythm at 72 bpm.  Normal intervals.  No evidence of ischemia.   2. Hypertension: Blood pressure well-controlled at this time on current medications.   A low-salt diet discussed.   3. Hyperlipidemia:  Review of recent bloodwork reveals a total cholesterol of 210, HDL 36, , triglycerides 167.  TC/HDL ratio 5.8. Patient currently following a low-fat / low-cholesterol diet and using Vascepa. Has not been back on Atorvastatin due to fungal toenail therapy which is now completed. I have advised patient to resume Atorvastatin at 20 mg q.h.s. Repeat bloodwork in approximately two months with an office visit thereafter.     4. Valvular heart disease: Patient with known echocardiographic evidence of mild aortic insufficiency, trace mitral regurgitation, and mild tricuspid regurgitation. Normal left ventricular function with EF of 65-70%. No symptoms at this point.   5. Recent nuclear stress test was performed to assess coronary circulation. Utilizing a [place] protocol, the patient exercised for approximately 11 minutes and obtained 98% of his predicted maximal heart rate. At a peak heart rate of 155 bpm, there were no significant EKG changes to suggest ischemia. The patient did not experience chest pain or arrhythmia with exercise. SPECT myocardial perfusion imaging revealed a homogeneous uptake of isotope throughout most of the left ventricular myocardial mass. Mild soft tissue attenuation noted. No evidence of exercise-induced reversible ischemia or fixed defects to suggest an antecedent infarction. The gated portion of this evaluation revealed normal resting left ventricular systolic function. The patient was reassured of these findings and advised on regular aerobic exercise as well as continuation of his low-fat / low-cholesterol diet. If clinically stable, will reevaluate in three months follow fasting bloodwork.

## 2023-11-29 RX ORDER — KIT FOR THE PREPARATION OF TECHNETIUM TC99M SESTAMIBI 1 MG/5ML
INJECTION, POWDER, LYOPHILIZED, FOR SOLUTION PARENTERAL
Refills: 0 | Status: COMPLETED | OUTPATIENT
Start: 2023-11-29

## 2023-11-29 RX ADMIN — KIT FOR THE PREPARATION OF TECHNETIUM TC99M SESTAMIBI 0: 1 INJECTION, POWDER, LYOPHILIZED, FOR SOLUTION PARENTERAL at 00:00

## 2023-12-20 ENCOUNTER — APPOINTMENT (OUTPATIENT)
Dept: CARDIOLOGY | Facility: CLINIC | Age: 63
End: 2023-12-20
Payer: COMMERCIAL

## 2023-12-20 VITALS
HEART RATE: 69 BPM | DIASTOLIC BLOOD PRESSURE: 76 MMHG | RESPIRATION RATE: 15 BRPM | SYSTOLIC BLOOD PRESSURE: 132 MMHG | BODY MASS INDEX: 25.37 KG/M2 | WEIGHT: 162 LBS

## 2023-12-20 PROCEDURE — 99214 OFFICE O/P EST MOD 30 MIN: CPT | Mod: 25

## 2023-12-20 PROCEDURE — 93000 ELECTROCARDIOGRAM COMPLETE: CPT

## 2024-02-14 ENCOUNTER — APPOINTMENT (OUTPATIENT)
Dept: CARDIOLOGY | Facility: CLINIC | Age: 64
End: 2024-02-14
Payer: COMMERCIAL

## 2024-02-14 ENCOUNTER — NON-APPOINTMENT (OUTPATIENT)
Age: 64
End: 2024-02-14

## 2024-02-14 VITALS
HEIGHT: 67 IN | HEART RATE: 72 BPM | RESPIRATION RATE: 16 BRPM | DIASTOLIC BLOOD PRESSURE: 84 MMHG | BODY MASS INDEX: 25.11 KG/M2 | WEIGHT: 160 LBS | SYSTOLIC BLOOD PRESSURE: 136 MMHG

## 2024-02-14 DIAGNOSIS — Z00.00 ENCOUNTER FOR GENERAL ADULT MEDICAL EXAMINATION W/OUT ABNORMAL FINDINGS: ICD-10-CM

## 2024-02-14 PROCEDURE — 99214 OFFICE O/P EST MOD 30 MIN: CPT

## 2024-02-14 PROCEDURE — 93000 ELECTROCARDIOGRAM COMPLETE: CPT

## 2024-02-14 RX ORDER — TAMSULOSIN HYDROCHLORIDE 0.4 MG/1
0.4 CAPSULE ORAL
Refills: 0 | Status: DISCONTINUED | COMMUNITY
End: 2024-02-14

## 2024-02-14 NOTE — PHYSICAL EXAM
[Well Developed] : well developed [Well Nourished] : well nourished [No Acute Distress] : no acute distress [Normal Conjunctiva] : normal conjunctiva [Normal Venous Pressure] : normal venous pressure [No Carotid Bruit] : no carotid bruit [Normal S1, S2] : normal S1, S2 [No Murmur] : no murmur [No Rub] : no rub [S4] : S4 [Clear Lung Fields] : clear lung fields [Normal Bowel Sounds] : normal bowel sounds [Normal Gait] : normal gait [No Edema] : no edema [No Rash] : no rash [Moves all extremities] : moves all extremities [No Focal Deficits] : no focal deficits [Normal Speech] : normal speech [Alert and Oriented] : alert and oriented [Normal memory] : normal memory

## 2024-02-14 NOTE — HISTORY OF PRESENT ILLNESS
[FreeTextEntry1] : Mr. Hurd presents today without complaints of exertional chest pain, shortness of breath, palpitations, lightheadedness or syncope.  He is concerned about his blood pressure lately.  States he has been under a significant amount of stress.  He has also been experiencing a lot of lower back pain.  Blood pressures at home have been running 110-150s/70-90s.

## 2024-02-14 NOTE — DISCUSSION/SUMMARY
[EKG obtained to assist in diagnosis and management of assessed problem(s)] : EKG obtained to assist in diagnosis and management of assessed problem(s) [FreeTextEntry1] : 1 - Hypertension:  Initial pressure 136/84.  Repeat 154/90 with our manual cuff, 158/101 home monitor.  Advised to continue current medications and strict low sodium diet.  Will continue to monitor his pressure at home three times per week.  He will contact us should his pressure continue to be elevated.  2 - Hyperlipidemia:  cholesterol 159, HDL 39, triglycerides 244, LDL 86, TC/HDL 4.1.  Continue Atorvastatin 20mg daily, Vascepa 2gm daily.  Follow low fat, low cholesterol, low carb diet.  Fasting blood work prior to follow up visit.  3 - Valvular heart disease:  clinically stable at this time.  Denies chest pain, shortness of breath, palpitations, lightheadedness or syncope.  4 - Patient has elevated stress level at home.  States he has xanax at home that he has never taken.  He will try taking it as needed to see if it helps with his anxiety and blood pressure.  Should his blood pressure continue to be elevated, may consider switching ramipril to valsartan 160mg daily.  5 - Follow up with Dr. Bermudez scheduled for June 25, 2024.

## 2024-03-26 NOTE — HISTORY OF PRESENT ILLNESS
[FreeTextEntry1] : From a cardiac standpoint, Mr. Hurd continues to do well denying chest pain, shortness of breath, or other cardiac symptoms.    No. SARINA screening performed.  STOP BANG Legend: 0-2 = LOW Risk; 3-4 = INTERMEDIATE Risk; 5-8 = HIGH Risk

## 2024-03-26 NOTE — ASSESSMENT
[FreeTextEntry1] : 1. EKG today reveals normal sinus rhythm at 69 bpm. Normal intervals. No evidence of ischemia.   2. Hypertension: Blood pressure controlled at this time on current medications. A low-salt diet is advised.   3. Hyperlipidemia: Review of recent bloodwork reveals a total cholesterol of 153, HDL 49, TC/HDL ratio 3.1, LDL 86, triglycerides 86. Patient advised to continue current medications as well as follow a strict low-fat / low-cholesterol diet.   4. Valvular heart disease: Patient without significant symptoms at this time. Is known to have mild aortic insufficiency as well as mild mitral regurgitation on prior echo. At this point, regular aerobic exercise is recommended. If clinically stable, office visit six months.

## 2024-06-25 ENCOUNTER — APPOINTMENT (OUTPATIENT)
Dept: CARDIOLOGY | Facility: CLINIC | Age: 64
End: 2024-06-25
Payer: COMMERCIAL

## 2024-06-25 VITALS
RESPIRATION RATE: 16 BRPM | HEIGHT: 67 IN | HEART RATE: 66 BPM | BODY MASS INDEX: 26.37 KG/M2 | WEIGHT: 168 LBS | DIASTOLIC BLOOD PRESSURE: 88 MMHG | SYSTOLIC BLOOD PRESSURE: 124 MMHG

## 2024-06-25 DIAGNOSIS — I10 ESSENTIAL (PRIMARY) HYPERTENSION: ICD-10-CM

## 2024-06-25 DIAGNOSIS — E78.5 HYPERLIPIDEMIA, UNSPECIFIED: ICD-10-CM

## 2024-06-25 DIAGNOSIS — I38 ENDOCARDITIS, VALVE UNSPECIFIED: ICD-10-CM

## 2024-06-25 DIAGNOSIS — R42 DIZZINESS AND GIDDINESS: ICD-10-CM

## 2024-06-25 PROCEDURE — 99214 OFFICE O/P EST MOD 30 MIN: CPT

## 2024-06-25 PROCEDURE — G2211 COMPLEX E/M VISIT ADD ON: CPT | Mod: NC

## 2024-06-25 PROCEDURE — 93000 ELECTROCARDIOGRAM COMPLETE: CPT

## 2024-06-25 RX ORDER — RAMIPRIL 10 MG/1
10 CAPSULE ORAL
Qty: 180 | Refills: 3 | Status: ACTIVE | COMMUNITY

## 2024-06-25 RX ORDER — OMEPRAZOLE 20 MG/1
20 CAPSULE, DELAYED RELEASE ORAL DAILY
Refills: 0 | Status: ACTIVE | COMMUNITY

## 2024-06-25 RX ORDER — TAMSULOSIN HYDROCHLORIDE 0.4 MG/1
0.4 CAPSULE ORAL
Refills: 0 | Status: ACTIVE | COMMUNITY

## 2024-06-25 RX ORDER — AMLODIPINE BESYLATE 5 MG/1
5 TABLET ORAL
Qty: 180 | Refills: 0 | Status: ACTIVE | COMMUNITY

## 2024-06-25 RX ORDER — ICOSAPENT ETHYL 0.5 G/1
0.5 CAPSULE ORAL
Qty: 720 | Refills: 1 | Status: ACTIVE | COMMUNITY
Start: 2023-04-26

## 2024-06-25 RX ORDER — SOLIFENACIN SUCCINATE 10 MG/1
10 TABLET ORAL DAILY
Refills: 0 | Status: DISCONTINUED | COMMUNITY
End: 2024-06-25

## 2024-06-25 RX ORDER — ATORVASTATIN CALCIUM 20 MG/1
20 TABLET, FILM COATED ORAL
Qty: 1 | Refills: 3 | Status: ACTIVE | COMMUNITY
Start: 2023-08-01

## 2024-08-22 NOTE — ED PROVIDER NOTE - OBJECTIVE STATEMENT
56 y/o male with a hx of GERD, HTN and elevated triglycerides presents to the ED c/o chest discomfort which onset today. Pt notes that he was eating dinner and suddenly noted a discomfort in his chest which he describes as a burning sensation. He states that he went to he spit up phlegm a few times. Pt activated EMS. He notes that his discomfort lasted approx 15-20 minutes and that sx have completely resolved at this time. Had a normal endoscopy about 4 days ago, normal stress test about 1 year ago. Denies SOB, vomiting, leg swelling, diaphoresis or cough. No further complaints at this time. [FreeTextEntry1] : obstructive hydrocephalus. Given image finding and current symptoms, surgical intervention of 3rd ventriculostomy  is recommended. Risks, benefit and alternative to the surgery was discussed with the patient and her mother. Patient's mother verbalizes understanding and would like to proceed.  PLAN - tentative surgery date on 9/3/2024 - medical clearance  I, Dr. Fan Marie, personally performed the evaluation and management (E/M) services for this new patient. That E/M includes conducting the clinically appropriate initial history &/or exam, assessing all conditions, and establishing the plan of care. Today, my JARED, Hyunchu Rebecca-Gold, was here to observe my evaluation and management service for this patient & follow plan of care established by me going forward.

## 2024-09-25 ENCOUNTER — APPOINTMENT (OUTPATIENT)
Dept: CARDIOLOGY | Facility: CLINIC | Age: 64
End: 2024-09-25

## 2024-12-11 LAB — A1CG - A1C WITH ESTIMATED AVERAGE GLUCOSE: 6.2

## 2024-12-11 NOTE — ED ADULT TRIAGE NOTE - PAIN: PRESENCE, MLM
Hospitalist History and Physical   Admit Date:  2024 10:14 AM   Name:  Marc Horton Sr.   Age:  72 y.o.  Sex:  male  :  1952   MRN:  [id number]   Room:  [facility]    Presenting/Chief Complaint: Abnormal Lab     Reason(s) for Admission: Anemia [D64.9]  Anemia, unspecified type [D64.9]     History of Present Illness:   Marc Horton Sr. is a 72 y.o. male with medical history of CAD, HFpEF, A-fib not on anticoagulation due to recent GI bleed, COPD, and recent hospitalization for sepsis with cellulitis, who presented with nausea.  Recently discharged to [facility] post acute rehab on 2024.  Noticed mild lightheadedness.  No fever, chills, chest pain, palpitations, nausea/vomiting, abdominal pain, or diarrhea.  No dark stool.    In ER, vitals were significant with /76.  CBC showed hemoglobin 6.6.  CMP relatively unremarkable.  Hemoccult positive.  Ordered Protonix and 1 unit of PRBC in ER.  Hospitalist was called for admission regarding symptomatic anemia.      Assessment & Plan:       Iron deficiency anemia  LA grade A esophagitis  Gastric ulcer  Duodenal ulcer  -Hemoglobin 6.6 on admission.  -Iron sat 5%, although ferritin within normal limits.  -Recent EGD on 2024 showed esophagitis, gastric and duodenal ulcer, without any evidence of active bleeding  -Colonoscopy approximately 5 years ago showed a few polyps.  -GI was consulted.  Appreciate recommendations. GI recommending no more scopes at this time.  -S/p 1 PRBC in ER  -Continue Protonix  -Will order iron IV  -If hemoglobin does not improve significantly with transfusion and IV iron, then GI will perform an EGD.      CAD s/p CABG  -Continue aspirin, lisinopril, Lopressor      Atrial fibrillation  -Not on anticoagulation due to recent GI bleeding  -Continue amiodarone and Lopressor      HFpEF  S/p aortic valve replacement  -Continue Bumex, beta-blocker, ACE inhibitor, spironolactone      COPD  -No evidence of acute 
complains of pain/discomfort

## 2024-12-18 ENCOUNTER — APPOINTMENT (OUTPATIENT)
Dept: CARDIOLOGY | Facility: CLINIC | Age: 64
End: 2024-12-18
Payer: COMMERCIAL

## 2024-12-18 VITALS
SYSTOLIC BLOOD PRESSURE: 130 MMHG | RESPIRATION RATE: 16 BRPM | DIASTOLIC BLOOD PRESSURE: 80 MMHG | WEIGHT: 176 LBS | BODY MASS INDEX: 27.62 KG/M2 | HEIGHT: 67 IN | HEART RATE: 62 BPM

## 2024-12-18 DIAGNOSIS — R73.03 PREDIABETES.: ICD-10-CM

## 2024-12-18 DIAGNOSIS — I10 ESSENTIAL (PRIMARY) HYPERTENSION: ICD-10-CM

## 2024-12-18 DIAGNOSIS — E78.5 HYPERLIPIDEMIA, UNSPECIFIED: ICD-10-CM

## 2024-12-18 PROCEDURE — G2211 COMPLEX E/M VISIT ADD ON: CPT | Mod: NC

## 2024-12-18 PROCEDURE — 99214 OFFICE O/P EST MOD 30 MIN: CPT

## 2024-12-18 PROCEDURE — 93000 ELECTROCARDIOGRAM COMPLETE: CPT

## 2025-01-14 ENCOUNTER — NON-APPOINTMENT (OUTPATIENT)
Age: 65
End: 2025-01-14

## 2025-01-14 ENCOUNTER — APPOINTMENT (OUTPATIENT)
Dept: CARDIOLOGY | Facility: CLINIC | Age: 65
End: 2025-01-14

## 2025-01-14 VITALS
BODY MASS INDEX: 27.62 KG/M2 | DIASTOLIC BLOOD PRESSURE: 80 MMHG | HEART RATE: 75 BPM | SYSTOLIC BLOOD PRESSURE: 130 MMHG | WEIGHT: 176 LBS | HEIGHT: 67 IN | RESPIRATION RATE: 16 BRPM

## 2025-01-14 DIAGNOSIS — R73.03 PREDIABETES.: ICD-10-CM

## 2025-01-14 DIAGNOSIS — I10 ESSENTIAL (PRIMARY) HYPERTENSION: ICD-10-CM

## 2025-01-14 DIAGNOSIS — E78.5 HYPERLIPIDEMIA, UNSPECIFIED: ICD-10-CM

## 2025-01-14 PROCEDURE — 93000 ELECTROCARDIOGRAM COMPLETE: CPT

## 2025-01-14 PROCEDURE — 99214 OFFICE O/P EST MOD 30 MIN: CPT

## 2025-03-13 ENCOUNTER — EMERGENCY (EMERGENCY)
Facility: HOSPITAL | Age: 65
LOS: 1 days | Discharge: HOME | End: 2025-03-13
Attending: STUDENT IN AN ORGANIZED HEALTH CARE EDUCATION/TRAINING PROGRAM

## 2025-03-13 VITALS
TEMPERATURE: 98 F | OXYGEN SATURATION: 98 % | DIASTOLIC BLOOD PRESSURE: 88 MMHG | HEIGHT: 67 IN | HEART RATE: 83 BPM | WEIGHT: 164.91 LBS | RESPIRATION RATE: 20 BRPM | SYSTOLIC BLOOD PRESSURE: 137 MMHG

## 2025-03-13 DIAGNOSIS — Z98.890 OTHER SPECIFIED POSTPROCEDURAL STATES: Chronic | ICD-10-CM

## 2025-03-13 DIAGNOSIS — Z96.0 PRESENCE OF UROGENITAL IMPLANTS: Chronic | ICD-10-CM

## 2025-03-13 DIAGNOSIS — Z96.612 PRESENCE OF LEFT ARTIFICIAL SHOULDER JOINT: Chronic | ICD-10-CM

## 2025-03-13 DIAGNOSIS — Z90.49 ACQUIRED ABSENCE OF OTHER SPECIFIED PARTS OF DIGESTIVE TRACT: Chronic | ICD-10-CM

## 2025-03-13 PROCEDURE — 99283 EMERGENCY DEPT VISIT LOW MDM: CPT | Mod: 25

## 2025-03-13 PROCEDURE — 51702 INSERT TEMP BLADDER CATH: CPT

## 2025-03-13 PROCEDURE — 99283 EMERGENCY DEPT VISIT LOW MDM: CPT

## 2025-03-13 NOTE — ED PROVIDER NOTE - OBJECTIVE STATEMENT
64 y/o M c/o urinary retention x 1 day.  Patient had a prostate biopsy earlier today and hasn't been able to urinate since then.  Denies fever or dysuria.  Patient is taking Bactrim.

## 2025-03-13 NOTE — ED ADULT NURSE NOTE - PRO INTERPRETER NEED 2
[phone] [employer]. English

## 2025-03-13 NOTE — ED ADULT NURSE NOTE - OBJECTIVE STATEMENT
Pt is a 64yo male who presents to the ER with complaints of urinary retention, pt had prostate procedure today, now having difficulty urinating. bladder distension. Upon assessment, PT is alert and oriented, with a patent and self maintained airway, with non labored breathing. PT denies CP, SOB, HA, N/V/D, Fevers or chills.

## 2025-05-02 LAB — A1CG - A1C WITH ESTIMATED AVERAGE GLUCOSE: 6.1

## 2025-05-05 ENCOUNTER — NON-APPOINTMENT (OUTPATIENT)
Age: 65
End: 2025-05-05

## 2025-06-10 ENCOUNTER — APPOINTMENT (OUTPATIENT)
Dept: CARDIOLOGY | Facility: CLINIC | Age: 65
End: 2025-06-10
Payer: COMMERCIAL

## 2025-06-10 VITALS
WEIGHT: 164 LBS | SYSTOLIC BLOOD PRESSURE: 110 MMHG | HEIGHT: 67 IN | RESPIRATION RATE: 16 BRPM | BODY MASS INDEX: 25.74 KG/M2 | DIASTOLIC BLOOD PRESSURE: 70 MMHG | HEART RATE: 63 BPM

## 2025-06-10 PROBLEM — C61 PROSTATE CANCER: Status: ACTIVE | Noted: 2025-06-10

## 2025-06-10 PROCEDURE — 93000 ELECTROCARDIOGRAM COMPLETE: CPT

## 2025-06-10 PROCEDURE — 99213 OFFICE O/P EST LOW 20 MIN: CPT

## 2025-06-10 PROCEDURE — G2211 COMPLEX E/M VISIT ADD ON: CPT | Mod: NC

## 2025-08-15 ENCOUNTER — NON-APPOINTMENT (OUTPATIENT)
Age: 65
End: 2025-08-15